# Patient Record
Sex: FEMALE | Race: WHITE | URBAN - METROPOLITAN AREA
[De-identification: names, ages, dates, MRNs, and addresses within clinical notes are randomized per-mention and may not be internally consistent; named-entity substitution may affect disease eponyms.]

---

## 2017-05-16 ENCOUNTER — OFFICE VISIT (OUTPATIENT)
Dept: PEDIATRICS | Facility: CLINIC | Age: 22
End: 2017-05-16
Payer: COMMERCIAL

## 2017-05-16 VITALS
HEIGHT: 69 IN | BODY MASS INDEX: 24.29 KG/M2 | WEIGHT: 164 LBS | SYSTOLIC BLOOD PRESSURE: 98 MMHG | HEART RATE: 80 BPM | TEMPERATURE: 98.3 F | DIASTOLIC BLOOD PRESSURE: 61 MMHG | OXYGEN SATURATION: 96 %

## 2017-05-16 DIAGNOSIS — E61.1 IRON DEFICIENCY: ICD-10-CM

## 2017-05-16 DIAGNOSIS — E78.00 HIGH CHOLESTEROL: ICD-10-CM

## 2017-05-16 DIAGNOSIS — Z00.00 ROUTINE GENERAL MEDICAL EXAMINATION AT A HEALTH CARE FACILITY: ICD-10-CM

## 2017-05-16 DIAGNOSIS — E06.3 HYPOTHYROIDISM DUE TO HASHIMOTO'S THYROIDITIS: Primary | ICD-10-CM

## 2017-05-16 DIAGNOSIS — E05.90 HYPERTHYROIDISM: ICD-10-CM

## 2017-05-16 DIAGNOSIS — G90.A POTS (POSTURAL ORTHOSTATIC TACHYCARDIA SYNDROME): ICD-10-CM

## 2017-05-16 DIAGNOSIS — L03.818 CELLULITIS OF OTHER SPECIFIED SITE: ICD-10-CM

## 2017-05-16 DIAGNOSIS — Z11.3 SCREEN FOR STD (SEXUALLY TRANSMITTED DISEASE): ICD-10-CM

## 2017-05-16 LAB
ANION GAP SERPL CALCULATED.3IONS-SCNC: 8 MMOL/L (ref 3–14)
BASOPHILS # BLD AUTO: 0 10E9/L (ref 0–0.2)
BASOPHILS NFR BLD AUTO: 0.2 %
BUN SERPL-MCNC: 13 MG/DL (ref 7–30)
CALCIUM SERPL-MCNC: 9.2 MG/DL (ref 8.5–10.1)
CHLORIDE SERPL-SCNC: 106 MMOL/L (ref 94–109)
CO2 SERPL-SCNC: 27 MMOL/L (ref 20–32)
CREAT SERPL-MCNC: 0.97 MG/DL (ref 0.52–1.04)
DIFFERENTIAL METHOD BLD: NORMAL
EOSINOPHIL # BLD AUTO: 0.1 10E9/L (ref 0–0.7)
EOSINOPHIL NFR BLD AUTO: 2.1 %
ERYTHROCYTE [DISTWIDTH] IN BLOOD BY AUTOMATED COUNT: 12.1 % (ref 10–15)
GFR SERPL CREATININE-BSD FRML MDRD: 72 ML/MIN/1.7M2
GLUCOSE SERPL-MCNC: 91 MG/DL (ref 70–99)
HCT VFR BLD AUTO: 41.9 % (ref 35–47)
HGB BLD-MCNC: 14.2 G/DL (ref 11.7–15.7)
IRON SATN MFR SERPL: 13 % (ref 15–46)
IRON SERPL-MCNC: 55 UG/DL (ref 35–180)
LDLC SERPL DIRECT ASSAY-MCNC: 36 MG/DL
LYMPHOCYTES # BLD AUTO: 1.5 10E9/L (ref 0.8–5.3)
LYMPHOCYTES NFR BLD AUTO: 28 %
MCH RBC QN AUTO: 30.1 PG (ref 26.5–33)
MCHC RBC AUTO-ENTMCNC: 33.9 G/DL (ref 31.5–36.5)
MCV RBC AUTO: 89 FL (ref 78–100)
MONOCYTES # BLD AUTO: 0.4 10E9/L (ref 0–1.3)
MONOCYTES NFR BLD AUTO: 8.3 %
NEUTROPHILS # BLD AUTO: 3.2 10E9/L (ref 1.6–8.3)
NEUTROPHILS NFR BLD AUTO: 61.4 %
PLATELET # BLD AUTO: 301 10E9/L (ref 150–450)
POTASSIUM SERPL-SCNC: 4.3 MMOL/L (ref 3.4–5.3)
RBC # BLD AUTO: 4.71 10E12/L (ref 3.8–5.2)
SODIUM SERPL-SCNC: 141 MMOL/L (ref 133–144)
TIBC SERPL-MCNC: 411 UG/DL (ref 240–430)
TSH SERPL DL<=0.005 MIU/L-ACNC: 2.78 MU/L (ref 0.4–4)
WBC # BLD AUTO: 5.3 10E9/L (ref 4–11)

## 2017-05-16 PROCEDURE — 87491 CHLMYD TRACH DNA AMP PROBE: CPT | Performed by: INTERNAL MEDICINE

## 2017-05-16 PROCEDURE — G0145 SCR C/V CYTO,THINLAYER,RESCR: HCPCS | Performed by: INTERNAL MEDICINE

## 2017-05-16 PROCEDURE — 83550 IRON BINDING TEST: CPT | Performed by: INTERNAL MEDICINE

## 2017-05-16 PROCEDURE — 80048 BASIC METABOLIC PNL TOTAL CA: CPT | Performed by: INTERNAL MEDICINE

## 2017-05-16 PROCEDURE — 84443 ASSAY THYROID STIM HORMONE: CPT | Performed by: INTERNAL MEDICINE

## 2017-05-16 PROCEDURE — 36415 COLL VENOUS BLD VENIPUNCTURE: CPT | Performed by: INTERNAL MEDICINE

## 2017-05-16 PROCEDURE — 99395 PREV VISIT EST AGE 18-39: CPT | Performed by: INTERNAL MEDICINE

## 2017-05-16 PROCEDURE — 83721 ASSAY OF BLOOD LIPOPROTEIN: CPT | Performed by: INTERNAL MEDICINE

## 2017-05-16 PROCEDURE — 87591 N.GONORRHOEAE DNA AMP PROB: CPT | Performed by: INTERNAL MEDICINE

## 2017-05-16 PROCEDURE — 85025 COMPLETE CBC W/AUTO DIFF WBC: CPT | Performed by: INTERNAL MEDICINE

## 2017-05-16 PROCEDURE — 83540 ASSAY OF IRON: CPT | Performed by: INTERNAL MEDICINE

## 2017-05-16 RX ORDER — MUPIROCIN 20 MG/G
OINTMENT TOPICAL 3 TIMES DAILY
Qty: 22 G | Refills: 1 | Status: SHIPPED | OUTPATIENT
Start: 2017-05-16 | End: 2017-05-21

## 2017-05-16 ASSESSMENT — PAIN SCALES - GENERAL: PAINLEVEL: NO PAIN (0)

## 2017-05-16 NOTE — NURSING NOTE
"Chief Complaint   Patient presents with     Physical       Initial BP 98/61 (BP Location: Right arm, Patient Position: Chair, Cuff Size: Adult Regular)  Pulse 80  Temp 98.3  F (36.8  C) (Oral)  Ht 5' 8.5\" (1.74 m)  Wt 164 lb (74.4 kg)  SpO2 96%  BMI 24.57 kg/m2 Estimated body mass index is 24.57 kg/(m^2) as calculated from the following:    Height as of this encounter: 5' 8.5\" (1.74 m).    Weight as of this encounter: 164 lb (74.4 kg).  Medication Reconciliation: complete   Natalie Zabala MA      "
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
## 2017-05-16 NOTE — MR AVS SNAPSHOT
After Visit Summary   5/16/2017    Francie Davidson    MRN: [id number]           Patient Information     Date Of Birth          1995        Visit Information        Provider Department      5/16/2017 9:20 AM Zander Petersen MD [facility]        Today's Diagnoses     Hypothyroidism due to Hashimoto's thyroiditis    -  1    Hyperthyroidism        POTS (postural orthostatic tachycardia syndrome)        Screen for STD (sexually transmitted disease)        Routine general medical examination at a health care facility        Iron deficiency        High cholesterol        Cellulitis of other specified site          Care Instructions      Preventive Health Recommendations  Female Ages 18 to 25     Yearly exam:     See your health care provider every year in order to  o Review health changes.   o Discuss preventive care.    o Review your medicines if your doctor has prescribed any.      You should be tested each year for STDs (sexually transmitted diseases).       After age 20, talk to your provider about how often you should have cholesterol testing.      Starting at age 21, get a Pap test every three years. If you have an abnormal result, your doctor may have you test more often.      If you are at risk for diabetes, you should have a diabetes test (fasting glucose).     Shots:     Get a flu shot each year.     Get a tetanus shot every 10 years.     Consider getting the shot (vaccine) that prevents cervical cancer (Gardasil).    Nutrition:     Eat at least 5 servings of fruits and vegetables each day.    Eat whole-grain bread, whole-wheat pasta and brown rice instead of white grains and rice.    Talk to your provider about Calcium and Vitamin D.     Lifestyle    Exercise at least 150 minutes a week each week (30 minutes a day, 5 days a week). This will help you control your weight and prevent disease.    Limit alcohol to one drink per day.    No smoking.     Wear sunscreen to  "prevent skin cancer.    See your dentist every six months for an exam and cleaning.        Follow-ups after your visit        Who to contact     If you have questions or need follow up information about today's clinic visit or your schedule please contact [facility] directly at [phone].  Normal or non-critical lab and imaging results will be communicated to you by MyChart, letter or phone within 4 business days after the clinic has received the results. If you do not hear from us within 7 days, please contact the clinic through "[employer]"hart or phone. If you have a critical or abnormal lab result, we will notify you by phone as soon as possible.  Submit refill requests through [employer] or call your pharmacy and they will forward the refill request to us. Please allow 3 business days for your refill to be completed.          Additional Information About Your Visit        MyChart Information     [employer] gives you secure access to your electronic health record. If you see a primary care provider, you can also send messages to your care team and make appointments. If you have questions, please call your primary care clinic.  If you do not have a primary care provider, please call [phone] and they will assist you.        Care EveryWhere ID     This is your Care EveryWhere ID. This could be used by other organizations to access your [place] medical records  [id number]        Your Vitals Were     Pulse Temperature Height Pulse Oximetry BMI (Body Mass Index)       80 98.3  F (36.8  C) (Oral) 5' 8.5\" (1.74 m) 96% 24.57 kg/m2        Blood Pressure from Last 3 Encounters:   05/16/17 98/61   03/23/15 108/66   02/18/15 (!) 84/51    Weight from Last 3 Encounters:   05/16/17 164 lb (74.4 kg)   03/23/15 163 lb (73.9 kg)   02/18/15 154 lb (69.9 kg) (83 %)*     * Growth percentiles are based on CDC 2-20 Years data.              We Performed the Following     Basic metabolic panel     CBC with platelets " and differential     CHLAMYDIA TRACHOMATIS PCR     Iron and iron binding capacity     LDL cholesterol direct     NEISSERIA GONORRHOEA PCR     Pap imaged thin layer screen only - recommended age 21 - 24 years     TSH with free T4 reflex          Today's Medication Changes          These changes are accurate as of: 5/16/17 10:01 AM.  If you have any questions, ask your nurse or doctor.               Start taking these medicines.        Dose/Directions    mupirocin 2 % ointment   Commonly known as:  BACTROBAN   Used for:  Cellulitis of other specified site   Started by:  Zander Petersen MD        Apply topically 3 times daily for 5 days   Quantity:  22 g   Refills:  1         Stop taking these medicines if you haven't already. Please contact your care team if you have questions.     fludrocortisone 0.1 MG tablet   Commonly known as:  FLORINEF   Stopped by:  Zander Petersen MD           IRON SUPPLEMENT 325 (65 FE) MG tablet   Generic drug:  ferrous sulfate   Stopped by:  Zander Petersen MD           ULTIMATE PROBIOTIC FORMULA PO   Stopped by:  Zander Petersen MD           vitamin D 2000 UNITS tablet   Stopped by:  Zander Petersen MD           ZYRTEC 5 MG tablet   Generic drug:  cetirizine   Stopped by:  Zander Petersen MD                Where to get your medicines      Some of these will need a paper prescription and others can be bought over the counter.  Ask your nurse if you have questions.     Bring a paper prescription for each of these medications     mupirocin 2 % ointment                Primary Care Provider Office Phone # Fax #    Zander Petersen [phone]       [facility] [address]  [facility] [address]        Thank you!     Thank you for choosing [facility]  for your care. Our goal is always to provide you with excellent care. Hearing back from our patients is one way we can continue to improve our services. Please take a few  minutes to complete the written survey that you may receive in the mail after your visit with us. Thank you!             Your Updated Medication List - Protect others around you: Learn how to safely use, store and throw away your medicines at www.disposemymeds.org.          This list is accurate as of: 5/16/17 10:01 AM.  Always use your most recent med list.                   Brand Name Dispense Instructions for use    ibuprofen 400 MG tablet    ADVIL/MOTRIN     occasionally as needed       mupirocin 2 % ointment    BACTROBAN    22 g    Apply topically 3 times daily for 5 days

## 2017-05-16 NOTE — LETTER
May 25, 2017      Francie Davidson  [address] [place] [address]    Dear Ms.O Davidson,      I am happy to inform you that your recent cervical cancer screening test (PAP smear) was normal.      Preventative screenings such as this help to ensure your health for years to come. You should repeat a pap smear in 3 years, unless otherwise directed.      You will still need to return to the clinic every year for your annual exam and other preventive tests.     Please contact the clinic at [phone] if you have further questions.       Sincerely,      Zander Petersen MD/raimundo

## 2017-05-18 LAB
C TRACH DNA SPEC QL NAA+PROBE: NORMAL
COPATH REPORT: NORMAL
N GONORRHOEA DNA SPEC QL NAA+PROBE: NORMAL
PAP: NORMAL
SPECIMEN SOURCE: NORMAL
SPECIMEN SOURCE: NORMAL

## 2018-04-20 ENCOUNTER — OFFICE VISIT (OUTPATIENT)
Dept: FAMILY MEDICINE | Facility: CLINIC | Age: 23
End: 2018-04-20
Payer: COMMERCIAL

## 2018-04-20 VITALS
OXYGEN SATURATION: 97 % | WEIGHT: 171 LBS | HEART RATE: 83 BPM | SYSTOLIC BLOOD PRESSURE: 111 MMHG | TEMPERATURE: 98.4 F | DIASTOLIC BLOOD PRESSURE: 70 MMHG | BODY MASS INDEX: 25.33 KG/M2 | HEIGHT: 69 IN

## 2018-04-20 DIAGNOSIS — R19.7 DIARRHEA, UNSPECIFIED TYPE: Primary | ICD-10-CM

## 2018-04-20 DIAGNOSIS — R11.2 NAUSEA AND VOMITING, INTRACTABILITY OF VOMITING NOT SPECIFIED, UNSPECIFIED VOMITING TYPE: ICD-10-CM

## 2018-04-20 LAB
ANION GAP SERPL CALCULATED.3IONS-SCNC: 8 MMOL/L (ref 3–14)
BUN SERPL-MCNC: 9 MG/DL (ref 7–30)
CALCIUM SERPL-MCNC: 9.3 MG/DL (ref 8.5–10.1)
CHLORIDE SERPL-SCNC: 107 MMOL/L (ref 94–109)
CO2 SERPL-SCNC: 27 MMOL/L (ref 20–32)
CREAT SERPL-MCNC: 0.82 MG/DL (ref 0.52–1.04)
ERYTHROCYTE [DISTWIDTH] IN BLOOD BY AUTOMATED COUNT: 12.8 % (ref 10–15)
GFR SERPL CREATININE-BSD FRML MDRD: 87 ML/MIN/1.7M2
GLUCOSE SERPL-MCNC: 93 MG/DL (ref 70–99)
HCT VFR BLD AUTO: 41.2 % (ref 35–47)
HGB BLD-MCNC: 14.2 G/DL (ref 11.7–15.7)
MCH RBC QN AUTO: 30.5 PG (ref 26.5–33)
MCHC RBC AUTO-ENTMCNC: 34.5 G/DL (ref 31.5–36.5)
MCV RBC AUTO: 89 FL (ref 78–100)
PLATELET # BLD AUTO: 313 10E9/L (ref 150–450)
POTASSIUM SERPL-SCNC: 4 MMOL/L (ref 3.4–5.3)
RBC # BLD AUTO: 4.65 10E12/L (ref 3.8–5.2)
SODIUM SERPL-SCNC: 142 MMOL/L (ref 133–144)
WBC # BLD AUTO: 5.8 10E9/L (ref 4–11)

## 2018-04-20 PROCEDURE — 99214 OFFICE O/P EST MOD 30 MIN: CPT | Performed by: PHYSICIAN ASSISTANT

## 2018-04-20 PROCEDURE — 87209 SMEAR COMPLEX STAIN: CPT | Performed by: PHYSICIAN ASSISTANT

## 2018-04-20 PROCEDURE — 36415 COLL VENOUS BLD VENIPUNCTURE: CPT | Performed by: PHYSICIAN ASSISTANT

## 2018-04-20 PROCEDURE — 80048 BASIC METABOLIC PNL TOTAL CA: CPT | Performed by: PHYSICIAN ASSISTANT

## 2018-04-20 PROCEDURE — 87177 OVA AND PARASITES SMEARS: CPT | Performed by: PHYSICIAN ASSISTANT

## 2018-04-20 PROCEDURE — 85027 COMPLETE CBC AUTOMATED: CPT | Performed by: PHYSICIAN ASSISTANT

## 2018-04-20 PROCEDURE — 83516 IMMUNOASSAY NONANTIBODY: CPT | Performed by: PHYSICIAN ASSISTANT

## 2018-04-20 RX ORDER — ONDANSETRON 4 MG/1
4 TABLET, FILM COATED ORAL EVERY 8 HOURS PRN
Qty: 18 TABLET | Refills: 0 | Status: SHIPPED | OUTPATIENT
Start: 2018-04-20

## 2018-04-20 NOTE — MR AVS SNAPSHOT
"              After Visit Summary   4/20/2018    Francie Davidson    MRN: [id number]           Patient Information     Date Of Birth          1995        Visit Information        Provider Department      4/20/2018 10:20 AM Ashley Wright PA-C [facility]        Today's Diagnoses     Diarrhea, unspecified type    -  1    Nausea and vomiting, intractability of vomiting not specified, unspecified vomiting type          Care Instructions    Collect stool sample then do elimination diet  Try zantac at night for vomiting     If not improving in 1 week return to clinic.            Follow-ups after your visit        Future tests that were ordered for you today     Open Future Orders        Priority Expected Expires Ordered    Ova and Parasite Exam Routine Routine  4/20/2019 4/20/2018            Who to contact     If you have questions or need follow up information about today's clinic visit or your schedule please contact [facility] directly at [phone].  Normal or non-critical lab and imaging results will be communicated to you by MyChart, letter or phone within 4 business days after the clinic has received the results. If you do not hear from us within 7 days, please contact the clinic through [employer] or phone. If you have a critical or abnormal lab result, we will notify you by phone as soon as possible.  Submit refill requests through [employer] or call your pharmacy and they will forward the refill request to us. Please allow 3 business days for your refill to be completed.          Additional Information About Your Visit        MyChart Information     [employer] lets you send messages to your doctor, view your test results, renew your prescriptions, schedule appointments and more. To sign up, go to [email or web address] . Click on \"Log in\" on the left side of the screen, which will take you to the Welcome page. Then click on \"Sign up Now\" on the right side of " "the page.     You will be asked to enter the access code listed below, as well as some personal information. Please follow the directions to create your username and password.     Your access code is: [email or web address]  Expires: 2018 11:07 AM     Your access code will  in 90 days. If you need help or a new code, please call your [facility] clinic or [phone].        Care EveryWhere ID     This is your Care EveryWhere ID. This could be used by other organizations to access your [facility] medical records  [id number]        Your Vitals Were     Pulse Temperature Height Pulse Oximetry BMI (Body Mass Index)       83 98.4  F (36.9  C) (Oral) 5' 8.5\" (1.74 m) 97% 25.62 kg/m2        Blood Pressure from Last 3 Encounters:   18 111/70   17 98/61   03/23/15 108/66    Weight from Last 3 Encounters:   18 171 lb (77.6 kg)   17 164 lb (74.4 kg)   03/23/15 163 lb (73.9 kg)              We Performed the Following     Basic metabolic panel     CBC with platelets     Tissue transglutaminase olive IgA and IgG          Today's Medication Changes          These changes are accurate as of 18 11:07 AM.  If you have any questions, ask your nurse or doctor.               Start taking these medicines.        Dose/Directions    ondansetron 4 MG tablet   Commonly known as:  ZOFRAN   Used for:  Nausea and vomiting, intractability of vomiting not specified, unspecified vomiting type   Started by:  Ashley Wright PA-C        Dose:  4 mg   Take 1 tablet (4 mg) by mouth every 8 hours as needed for nausea   Quantity:  18 tablet   Refills:  0       ranitidine 150 MG tablet   Commonly known as:  ZANTAC   Used for:  Nausea and vomiting, intractability of vomiting not specified, unspecified vomiting type   Started by:  Ashley Wright PA-C        Dose:  150 mg   Take 1 tablet (150 mg) by mouth 2 times daily   Quantity:  60 tablet   Refills:  1            Where to get your medicines      These " medications were sent to [facility] [place], [place] - [address]  [address], [facility] [address]     Phone:  [phone]     ondansetron 4 MG tablet    ranitidine 150 MG tablet                Primary Care Provider Office Phone # Fax #    Zander Petersen [phone]       [address] [facility] [facility] [address]        Equal Access to Services     YAMILETH HYMAN : Hadii aad ku hadasho Soomaali, waaxda luqadaha, qaybta kaalmada adeegyada, waxay idiin hayaan adeeg kharash la'kaley . So [facility] [phone].    ATENCIÓN: Si habla español, tiene a kim disposición servicios gratuitos de asistencia lingüística. Llame al [phone].    We comply with applicable federal civil rights laws and [place] laws. We do not discriminate on the basis of race, color, national origin, age, disability, sex, sexual orientation, or gender identity.            Thank you!     Thank you for choosing [facility]  for your care. Our goal is always to provide you with excellent care. Hearing back from our patients is one way we can continue to improve our services. Please take a few minutes to complete the written survey that you may receive in the mail after your visit with us. Thank you!             Your Updated Medication List - Protect others around you: Learn how to safely use, store and throw away your medicines at www.disposemymeds.org.          This list is accurate as of 4/20/18 11:07 AM.  Always use your most recent med list.                   Brand Name Dispense Instructions for use Diagnosis    etonogestrel 68 MG Impl    IMPLANON/NEXPLANON     1 each by Subdermal route once        ondansetron 4 MG tablet    ZOFRAN    18 tablet    Take 1 tablet (4 mg) by mouth every 8 hours as needed for nausea    Nausea and vomiting, intractability of vomiting not specified, unspecified vomiting type       ranitidine 150 MG tablet    ZANTAC    60  tablet    Take 1 tablet (150 mg) by mouth 2 times daily    Nausea and vomiting, intractability of vomiting not specified, unspecified vomiting type

## 2018-04-20 NOTE — PROGRESS NOTES
SUBJECTIVE:   Francie Davidson is a 23 year old female who presents to clinic today for the following health issues:        Acute Illness   Acute illness concerns: vomiting 5 days and diarrhea 1 month   Onset: 1 month     Fever: YES- low grade fever     Chills/Sweats: YES-last 5 days     Headache (location?): YES last 2 days     Sinus Pressure:YES    Conjunctivitis:  no    Ear Pain: no    Rhinorrhea: no     Congestion: YES last 2 days     Sore Throat: no      Cough: no    Wheeze: no     Decreased Appetite: YES    Nausea: YES    Vomiting: YES    Diarrhea:  YES    Dysuria/Freq.: no     Fatigue/Achiness: YES    Sick/Strep Exposure: no     No appetite      Therapies Tried and outcome: none     Vomited this am.  Decrease in amount.  No stomach pain.    Usually has normal regular bm.  Mid march started having diarrhea once a day, now more frequent.  Some stomach cramping with diarrhea.   [facility].    No exposures.  No travel.  No increase in stress  No blood in stool.    Vomiting all in the am   Cold symptoms started 2 days ago.  Not really related.    No weight loss   No urinary symptoms         Problem list and histories reviewed & adjusted, as indicated.  Additional history: as documented    Patient Active Problem List   Diagnosis     Dizzy     Weakness generalized     Weight loss, non-intentional     ADD (attention deficit disorder)     Sensory disorder     Environmental allergies     Obstipation     Sensory Integration Disorder     POTS (postural orthostatic tachycardia syndrome)     Vaginal discomfort     Hyperthyroidism     Hypothyroidism     Past Surgical History:   Procedure Laterality Date     NO HISTORY OF SURGERY         Social History   Substance Use Topics     Smoking status: Never Smoker     Smokeless tobacco: Never Used      Comment: No second hand exposure     Alcohol use No      Comment: Twice a month     Family History   Problem Relation Age of Onset      "Congenital Anomalies Father      Scoliosis     Psychotic Disorder Father      Possible Asberger's     HEART DISEASE Mother      tachycardia     HEART DISEASE Maternal Aunt      tachycardia     Unknown/Adopted Maternal Grandmother      Unknown/Adopted Maternal Grandfather      Unknown/Adopted Paternal Grandmother      Obesity Paternal Grandmother      Unknown/Adopted Paternal Grandfather      Unknown/Adopted Brother      Unknown/Adopted Sister      Congenital Anomalies Sister      Scoliosis     HEART DISEASE Sister      murmur     Psychotic Disorder Brother      1/2 brother with Autism     Psychotic Disorder Brother      1/2 Brother with Autism           Reviewed and updated as needed this visit by clinical staff  Tobacco  Allergies  Meds  Med Hx  Surg Hx  Fam Hx  Soc Hx      Reviewed and updated as needed this visit by Provider         ROS:  As above    OBJECTIVE:     /70  Pulse 83  Temp 98.4  F (36.9  C) (Oral)  Ht 5' 8.5\" (1.74 m)  Wt 171 lb (77.6 kg)  SpO2 97%  BMI 25.62 kg/m2  Body mass index is 25.62 kg/(m^2).  GENERAL: healthy, alert and no distress  RESP: lungs clear to auscultation - no rales, rhonchi or wheezes  CV: regular rates and rhythm, normal S1 S2, no S3 or S4 and no murmur, click or rub  ABDOMEN: tenderness umbilical and bowel sounds normal    Diagnostic Test Results:  none     ASSESSMENT/PLAN:       1. Diarrhea, unspecified type  Unsure of cause.  Follow up after labs are back.  Encouraged elimination diet as well  - Basic metabolic panel  - Tissue transglutaminase olive IgA and IgG  - CBC with platelets  - Ova and Parasite Exam Routine; Future    2. Nausea and vomiting, intractability of vomiting not specified, unspecified vomiting type  likely viral or GERD.  Start zantac.    - ranitidine (ZANTAC) 150 MG tablet; Take 1 tablet (150 mg) by mouth 2 times daily  Dispense: 60 tablet; Refill: 1  - ondansetron (ZOFRAN) 4 MG tablet; Take 1 tablet (4 mg) by mouth every 8 hours as needed " for nausea  Dispense: 18 tablet; Refill: 0    Patient Instructions   Collect stool sample then do elimination diet  Try zantac at night for vomiting     If not improving in 1 week return to clinic.        Ashley Wright PA-C  [facility]

## 2018-04-20 NOTE — PATIENT INSTRUCTIONS
Collect stool sample then do elimination diet  Try zantac at night for vomiting     If not improving in 1 week return to clinic.

## 2018-04-20 NOTE — NURSING NOTE
"Chief Complaint   Patient presents with     Vomiting     x 5 days and diarrhea x 1 month        Initial /70  Pulse 83  Temp 98.4  F (36.9  C) (Oral)  Ht 5' 8.5\" (1.74 m)  Wt 171 lb (77.6 kg)  SpO2 97%  BMI 25.62 kg/m2 Estimated body mass index is 25.62 kg/(m^2) as calculated from the following:    Height as of this encounter: 5' 8.5\" (1.74 m).    Weight as of this encounter: 171 lb (77.6 kg).  Medication Reconciliation: complete  Flaco Montgomery MA    "

## 2018-04-23 LAB
O+P STL MICRO: NORMAL
O+P STL MICRO: NORMAL
SPECIMEN SOURCE: NORMAL
TTG IGA SER-ACNC: <1 U/ML
TTG IGG SER-ACNC: 1 U/ML

## 2018-04-24 ENCOUNTER — TELEPHONE (OUTPATIENT)
Dept: FAMILY MEDICINE | Facility: CLINIC | Age: 23
End: 2018-04-24

## 2018-04-24 NOTE — TELEPHONE ENCOUNTER
Attempt # 1  Called patient at home number.[phone]  Was call answered?  Yes, relayed below message. Patient verbalized understanding and agreement with plan and states started throwing up blood yesterday and went to urgent care who told her she has a GI infection and needs to see primary care provider.    Scheduled for noon on Wednesday.    Routing to PCP  Tracie Hess RN  [facility]

## 2018-04-24 NOTE — TELEPHONE ENCOUNTER
Please call pt.  All her labs and stool samples are normal.  If she continues to have diarrhea she should be seen again.     Ashley Wright PA-C

## 2018-04-25 ENCOUNTER — OFFICE VISIT (OUTPATIENT)
Dept: FAMILY MEDICINE | Facility: CLINIC | Age: 23
End: 2018-04-25
Payer: COMMERCIAL

## 2018-04-25 VITALS
TEMPERATURE: 97.8 F | HEART RATE: 72 BPM | OXYGEN SATURATION: 100 % | SYSTOLIC BLOOD PRESSURE: 108 MMHG | DIASTOLIC BLOOD PRESSURE: 72 MMHG | WEIGHT: 170 LBS | BODY MASS INDEX: 25.47 KG/M2

## 2018-04-25 DIAGNOSIS — F43.22 ADJUSTMENT DISORDER WITH ANXIOUS MOOD: ICD-10-CM

## 2018-04-25 DIAGNOSIS — E06.3 HYPOTHYROIDISM DUE TO HASHIMOTO'S THYROIDITIS: Primary | ICD-10-CM

## 2018-04-25 DIAGNOSIS — Z13.88 SCREENING FOR LEAD EXPOSURE: ICD-10-CM

## 2018-04-25 DIAGNOSIS — R19.7 DIARRHEA OF PRESUMED INFECTIOUS ORIGIN: ICD-10-CM

## 2018-04-25 DIAGNOSIS — G90.A POTS (POSTURAL ORTHOSTATIC TACHYCARDIA SYNDROME): ICD-10-CM

## 2018-04-25 LAB
LEAD BLD-MCNC: NORMAL UG/DL (ref 0–4.9)
SPECIMEN SOURCE: NORMAL

## 2018-04-25 PROCEDURE — 82728 ASSAY OF FERRITIN: CPT | Performed by: INTERNAL MEDICINE

## 2018-04-25 PROCEDURE — 83655 ASSAY OF LEAD: CPT | Mod: 90 | Performed by: INTERNAL MEDICINE

## 2018-04-25 PROCEDURE — 84443 ASSAY THYROID STIM HORMONE: CPT | Performed by: INTERNAL MEDICINE

## 2018-04-25 PROCEDURE — 99000 SPECIMEN HANDLING OFFICE-LAB: CPT | Performed by: INTERNAL MEDICINE

## 2018-04-25 PROCEDURE — 36415 COLL VENOUS BLD VENIPUNCTURE: CPT | Performed by: INTERNAL MEDICINE

## 2018-04-25 PROCEDURE — 99214 OFFICE O/P EST MOD 30 MIN: CPT | Performed by: INTERNAL MEDICINE

## 2018-04-25 RX ORDER — BUSPIRONE HYDROCHLORIDE 10 MG/1
10 TABLET ORAL 3 TIMES DAILY
Qty: 40 TABLET | Refills: 1 | Status: SHIPPED | OUTPATIENT
Start: 2018-04-25

## 2018-04-25 RX ORDER — ESCITALOPRAM OXALATE 10 MG/1
10 TABLET ORAL DAILY
Qty: 90 TABLET | Refills: 3 | Status: SHIPPED | OUTPATIENT
Start: 2018-04-25

## 2018-04-25 RX ORDER — METRONIDAZOLE 500 MG/1
500 TABLET ORAL 3 TIMES DAILY
Qty: 21 TABLET | Refills: 0 | Status: SHIPPED | OUTPATIENT
Start: 2018-04-25 | End: 2018-05-02

## 2018-04-25 NOTE — PROGRESS NOTES
SUBJECTIVE:   Francie Davidson is a 23 year old female who presents to clinic today for the following health issues:    ED/UC Followup:    Facility: [facility]  Date of visit: 4/23/18  Reason for visit: Vomiting and Nausea  Current Status: Still not feeling good. Was able to sleep last night which was new. Not vomiting blood anymore. Still very nauseated.     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    No travel.   No antibiotics.   Did painting around that time ( some old lead paint)   Had panic attack  LIke HAINES related   Stress level.  Shaking .  Developing over this time   Here .          Problem list and histories reviewed & adjusted, as indicated.  Additional history: as documented    Patient Active Problem List   Diagnosis     Dizzy     Weakness generalized     Weight loss, non-intentional     ADD (attention deficit disorder)     Sensory disorder     Environmental allergies     Obstipation     Sensory Integration Disorder     POTS (postural orthostatic tachycardia syndrome)     Vaginal discomfort     Hyperthyroidism     Hypothyroidism     Past Surgical History:   Procedure Laterality Date     NO HISTORY OF SURGERY         Social History   Substance Use Topics     Smoking status: Never Smoker     Smokeless tobacco: Never Used      Comment: No second hand exposure     Alcohol use No      Comment: Twice a month     Family History   Problem Relation Age of Onset     Congenital Anomalies Father      Scoliosis     Psychotic Disorder Father      Possible Asberger's     HEART DISEASE Mother      tachycardia     HEART DISEASE Maternal Aunt      tachycardia     Unknown/Adopted Maternal Grandmother      Unknown/Adopted Maternal Grandfather      Unknown/Adopted Paternal Grandmother      Obesity Paternal Grandmother      Unknown/Adopted Paternal Grandfather      Unknown/Adopted Brother      Unknown/Adopted Sister      Congenital Anomalies Sister      Scoliosis     HEART DISEASE Sister      murmur      Psychotic Disorder Brother      1/2 brother with Autism     Psychotic Disorder Brother      1/2 Brother with Autism         Current Outpatient Prescriptions   Medication Sig Dispense Refill     busPIRone (BUSPAR) 10 MG tablet Take 1 tablet (10 mg) by mouth 3 times daily Without dyes in the tablet 40 tablet 1     escitalopram (LEXAPRO) 10 MG tablet Take 1 tablet (10 mg) by mouth daily Without dyes 90 tablet 3     etonogestrel (IMPLANON/NEXPLANON) 68 MG IMPL 1 each by Subdermal route once       metroNIDAZOLE (FLAGYL) 500 MG tablet Take 1 tablet (500 mg) by mouth 3 times daily for 7 days 21 tablet 0     ondansetron (ZOFRAN) 4 MG tablet Take 1 tablet (4 mg) by mouth every 8 hours as needed for nausea 18 tablet 0     ranitidine (ZANTAC) 150 MG tablet Take 1 tablet (150 mg) by mouth 2 times daily (Patient not taking: Reported on 4/25/2018) 60 tablet 1     Allergies   Allergen Reactions     Dye [Brilliant Blue Fcf] Nausea and Vomiting and Hives     Headache      Food Nausea and Vomiting, Hives and Diarrhea     Canteloupe, Pineapple, preservatives.     Salicylates Nausea and Vomiting, Hives and Diarrhea     Behavioral issues.  Usually when builds up in system     Sulfa Drugs Rash     Recent Labs   Lab Test  04/25/18   1246  04/20/18   1116  05/16/17   1007  03/23/15   1633  02/18/15   1639   LDL   --    --   36   --    --    ALT   --    --    --   23  436*   CR   --   0.82  0.97   --   1.02*   GFRESTIMATED   --   87  72   --   69   GFRESTBLACK   --   >90  87   --   84   POTASSIUM   --   4.0  4.3   --   4.6   TSH  1.40   --   2.78   --    --         Reviewed and updated as needed this visit by clinical staff       Reviewed and updated as needed this visit by Provider         ROS:  Constitutional, HEENT, cardiovascular, pulmonary, gi and gu systems are negative, except as otherwise noted.    OBJECTIVE:     /72 (BP Location: Right arm, Patient Position: Chair, Cuff Size: Adult Regular)  Pulse 72  Temp 97.8  F (36.6   C) (Oral)  Wt 170 lb (77.1 kg)  SpO2 100%  BMI 25.47 kg/m2  Body mass index is 25.47 kg/(m^2).  GENERAL: healthy, alert and no distress  EYES: Eyes grossly normal to inspection, PERRL and conjunctivae and sclerae normal  HENT: ear canals and TM's normal, nose and mouth without ulcers or lesions  NECK: no adenopathy, no asymmetry, masses, or scars and thyroid normal to palpation  RESP: lungs clear to auscultation - no rales, rhonchi or wheezes  CV: regular rate and rhythm, normal S1 S2, no S3 or S4, no murmur, click or rub, no peripheral edema and peripheral pulses strong  ABDOMEN: soft, nontender, no hepatosplenomegaly, no masses and bowel sounds normal  MS: no gross musculoskeletal defects noted, no edema  SKIN: no suspicious lesions or rashes  NEURO: Normal strength and tone, mentation intact and speech normal  BACK: no CVA tenderness, no paralumbar tenderness  PSYCH: mentation appears normal, affect normal/bright  LYMPH: no cervical, supraclavicular, axillary, or inguinal adenopathy    Diagnostic Test Results:  Results for orders placed or performed in visit on 04/25/18   TSH with free T4 reflex   Result Value Ref Range    TSH 1.40 0.40 - 4.00 mU/L   Ferritin   Result Value Ref Range    Ferritin 44 12 - 150 ng/mL   Lead Capillary   Result Value Ref Range    Lead Result Canceled, Test credited 0.0 - 4.9 ug/dL    Lead Specimen Type Venous blood        ASSESSMENT/PLAN:       ICD-10-CM    1. Hypothyroidism due to Hashimoto's thyroiditis E03.8 TSH with free T4 reflex    E06.3 Ferritin   2. POTS (postural orthostatic tachycardia syndrome) R00.0 TSH with free T4 reflex    I95.1 Ferritin   3. Screening for lead exposure Z13.88 Lead Capillary     Lead Venous Blood Confirm     CANCELED: LEAD, BLOOD, VENOUS   4. Diarrhea of presumed infectious origin A09 metroNIDAZOLE (FLAGYL) 500 MG tablet   5. Adjustment disorder with anxious mood F43.22 escitalopram (LEXAPRO) 10 MG tablet     busPIRone (BUSPAR) 10 MG tablet     No  orthostatis on exam /74 went up heart rate stayed the same         Zander Petersen MD  [facility]

## 2018-04-25 NOTE — MR AVS SNAPSHOT
"              After Visit Summary   4/25/2018    Francie Davidson    MRN: [id number]           Patient Information     Date Of Birth          1995        Visit Information        Provider Department      4/25/2018 12:00 PM Zander Petersen MD [facility]        Today's Diagnoses     Hypothyroidism due to Hashimoto's thyroiditis    -  1    POTS (postural orthostatic tachycardia syndrome)        Screening for lead exposure        Diarrhea of presumed infectious origin        Adjustment disorder with anxious mood           Follow-ups after your visit        Who to contact     If you have questions or need follow up information about today's clinic visit or your schedule please contact [facility] directly at [phone].  Normal or non-critical lab and imaging results will be communicated to you by MyChart, letter or phone within 4 business days after the clinic has received the results. If you do not hear from us within 7 days, please contact the clinic through MyChart or phone. If you have a critical or abnormal lab result, we will notify you by phone as soon as possible.  Submit refill requests through [facility] [facility] or call your pharmacy and they will forward the refill request to us. Please allow 3 business days for your refill to be completed.          Additional Information About Your Visit        MyChart Information     [employer] lets you send messages to your doctor, view your test results, renew your prescriptions, schedule appointments and more. To sign up, go to [email or web address] . Click on \"Log in\" on the left side of the screen, which will take you to the Welcome page. Then click on \"Sign up Now\" on the right side of the page.     You will be asked to enter the access code listed below, as well as some personal information. Please follow the directions to create your username and password.     Your access code is: [email or web address]  Expires: 7/19/2018 11:07 AM   "   Your access code will  in 90 days. If you need help or a new code, please call your [place] clinic or [phone].        Care EveryWhere ID     This is your Care EveryWhere ID. This could be used by other organizations to access your [place] medical records  [id number]        Your Vitals Were     Pulse Temperature Pulse Oximetry BMI (Body Mass Index)          72 97.8  F (36.6  C) (Oral) 100% 25.47 kg/m2         Blood Pressure from Last 3 Encounters:   18 108/72   18 111/70   17 98/61    Weight from Last 3 Encounters:   18 170 lb (77.1 kg)   18 171 lb (77.6 kg)   17 164 lb (74.4 kg)              We Performed the Following     Ferritin     LEAD, BLOOD, VENOUS     TSH with free T4 reflex          Today's Medication Changes          These changes are accurate as of 18 12:32 PM.  If you have any questions, ask your nurse or doctor.               Start taking these medicines.        Dose/Directions    busPIRone 10 MG tablet   Commonly known as:  BUSPAR   Used for:  Adjustment disorder with anxious mood   Started by:  Zander Petersen MD        Dose:  10 mg   Take 1 tablet (10 mg) by mouth 3 times daily Without dyes in the tablet   Quantity:  40 tablet   Refills:  1       escitalopram 10 MG tablet   Commonly known as:  LEXAPRO   Used for:  Adjustment disorder with anxious mood   Started by:  Zander Petersen MD        Dose:  10 mg   Take 1 tablet (10 mg) by mouth daily Without dyes   Quantity:  90 tablet   Refills:  3       metroNIDAZOLE 500 MG tablet   Commonly known as:  FLAGYL   Used for:  Diarrhea of presumed infectious origin   Started by:  Zander Petersen MD        Dose:  500 mg   Take 1 tablet (500 mg) by mouth 3 times daily for 7 days   Quantity:  21 tablet   Refills:  0            Where to get your medicines      These medications were sent to [facility] - [place], [place] - [address]  [address] [facility] [address]     Phone:  [phone]     busPIRone 10 MG tablet    escitalopram 10 MG tablet    metroNIDAZOLE 500 MG tablet                Primary Care Provider Office Phone # Fax #    Zander Petersen [phone]       [address] [facility] [address]        Equal Access to Services     YAMILETH HYMAN : Hadii aad ku hadasho Soomaali, waaxda luqadaha, qaybta kaalmada adeegyada, waxay idiin hayaan adeeg lamin lab[place] . So [facility] [phone].    ATENCIÓN: Si habla español, tiene a kim disposición servicios gratuitos de asistencia lingüística. Llame al [phone].    We comply with applicable federal civil rights laws and [place] laws. We do not discriminate on the basis of race, color, national origin, age, disability, sex, sexual orientation, or gender identity.            Thank you!     Thank you for choosing [facility]  for your care. Our goal is always to provide you with excellent care. Hearing back from our patients is one way we can continue to improve our services. Please take a few minutes to complete the written survey that you may receive in the mail after your visit with us. Thank you!             Your Updated Medication List - Protect others around you: Learn how to safely use, store and throw away your medicines at www.disposemymeds.org.          This list is accurate as of 4/25/18 12:32 PM.  Always use your most recent med list.                   Brand Name Dispense Instructions for use Diagnosis    busPIRone 10 MG tablet    BUSPAR    40 tablet    Take 1 tablet (10 mg) by mouth 3 times daily Without dyes in the tablet    Adjustment disorder with anxious mood       escitalopram 10 MG tablet    LEXAPRO    90 tablet    Take 1 tablet (10 mg) by mouth daily Without dyes    Adjustment disorder with anxious mood       etonogestrel 68 MG Impl    IMPLANON/NEXPLANON     1 each by Subdermal route once        metroNIDAZOLE 500 MG tablet    FLAGYL    21  tablet    Take 1 tablet (500 mg) by mouth 3 times daily for 7 days    Diarrhea of presumed infectious origin       ondansetron 4 MG tablet    ZOFRAN    18 tablet    Take 1 tablet (4 mg) by mouth every 8 hours as needed for nausea    Nausea and vomiting, intractability of vomiting not specified, unspecified vomiting type       ranitidine 150 MG tablet    ZANTAC    60 tablet    Take 1 tablet (150 mg) by mouth 2 times daily    Nausea and vomiting, intractability of vomiting not specified, unspecified vomiting type

## 2018-04-25 NOTE — NURSING NOTE
"Chief Complaint   Patient presents with     ER F/U       Initial /72 (BP Location: Right arm, Patient Position: Chair, Cuff Size: Adult Regular)  Pulse 72  Temp 97.8  F (36.6  C) (Oral)  Wt 170 lb (77.1 kg)  SpO2 100%  BMI 25.47 kg/m2 Estimated body mass index is 25.47 kg/(m^2) as calculated from the following:    Height as of 4/20/18: 5' 8.5\" (1.74 m).    Weight as of this encounter: 170 lb (77.1 kg).  Medication Reconciliation: complete   Natalie Zabala MA      "

## 2018-04-26 LAB
FERRITIN SERPL-MCNC: 44 NG/ML (ref 12–150)
TSH SERPL DL<=0.005 MIU/L-ACNC: 1.4 MU/L (ref 0.4–4)

## 2018-04-27 LAB — LEAD BLDV-MCNC: <2 UG/DL (ref 0–4.9)

## 2018-05-02 ENCOUNTER — TELEPHONE (OUTPATIENT)
Dept: FAMILY MEDICINE | Facility: CLINIC | Age: 23
End: 2018-05-02

## 2018-05-02 NOTE — TELEPHONE ENCOUNTER
Forms received from: [employer]   Phone number listed: [phone]   Fax listed: [phone]  Date received: 5-2-18  Form description: disability  Once forms are completed, please return to [facility]  via fax.  Is patient requesting to be contacted when forms are completed: n/a  Form placed: provider desk  [place]

## 2020-02-24 ENCOUNTER — HEALTH MAINTENANCE LETTER (OUTPATIENT)
Age: 25
End: 2020-02-24

## 2020-12-13 ENCOUNTER — HEALTH MAINTENANCE LETTER (OUTPATIENT)
Age: 25
End: 2020-12-13

## 2021-04-17 ENCOUNTER — HEALTH MAINTENANCE LETTER (OUTPATIENT)
Age: 26
End: 2021-04-17

## 2021-09-26 ENCOUNTER — HEALTH MAINTENANCE LETTER (OUTPATIENT)
Age: 26
End: 2021-09-26

## 2022-05-08 ENCOUNTER — HEALTH MAINTENANCE LETTER (OUTPATIENT)
Age: 27
End: 2022-05-08

## 2023-04-23 ENCOUNTER — HEALTH MAINTENANCE LETTER (OUTPATIENT)
Age: 28
End: 2023-04-23

## 2023-06-02 ENCOUNTER — HEALTH MAINTENANCE LETTER (OUTPATIENT)
Age: 28
End: 2023-06-02